# Patient Record
Sex: FEMALE | Race: WHITE | NOT HISPANIC OR LATINO | URBAN - METROPOLITAN AREA
[De-identification: names, ages, dates, MRNs, and addresses within clinical notes are randomized per-mention and may not be internally consistent; named-entity substitution may affect disease eponyms.]

---

## 2019-04-10 ENCOUNTER — EMERGENCY (EMERGENCY)
Facility: HOSPITAL | Age: 39
LOS: 1 days | Discharge: HOME | End: 2019-04-10
Attending: EMERGENCY MEDICINE | Admitting: EMERGENCY MEDICINE
Payer: COMMERCIAL

## 2019-04-10 VITALS
RESPIRATION RATE: 18 BRPM | HEART RATE: 94 BPM | OXYGEN SATURATION: 98 % | TEMPERATURE: 99 F | SYSTOLIC BLOOD PRESSURE: 102 MMHG | DIASTOLIC BLOOD PRESSURE: 66 MMHG

## 2019-04-10 VITALS
WEIGHT: 134.92 LBS | SYSTOLIC BLOOD PRESSURE: 112 MMHG | HEIGHT: 67 IN | OXYGEN SATURATION: 100 % | HEART RATE: 97 BPM | DIASTOLIC BLOOD PRESSURE: 74 MMHG | RESPIRATION RATE: 18 BRPM | TEMPERATURE: 97 F

## 2019-04-10 DIAGNOSIS — R07.0 PAIN IN THROAT: ICD-10-CM

## 2019-04-10 DIAGNOSIS — Z88.8 ALLERGY STATUS TO OTHER DRUGS, MEDICAMENTS AND BIOLOGICAL SUBSTANCES STATUS: ICD-10-CM

## 2019-04-10 DIAGNOSIS — Z79.899 OTHER LONG TERM (CURRENT) DRUG THERAPY: ICD-10-CM

## 2019-04-10 DIAGNOSIS — J03.90 ACUTE TONSILLITIS, UNSPECIFIED: ICD-10-CM

## 2019-04-10 LAB — S PYO AG SPEC QL IA: NEGATIVE — SIGNIFICANT CHANGE UP

## 2019-04-10 PROCEDURE — 87880 STREP A ASSAY W/OPTIC: CPT

## 2019-04-10 PROCEDURE — 85025 COMPLETE CBC W/AUTO DIFF WBC: CPT

## 2019-04-10 PROCEDURE — 80053 COMPREHEN METABOLIC PANEL: CPT

## 2019-04-10 PROCEDURE — 99284 EMERGENCY DEPT VISIT MOD MDM: CPT | Mod: 25

## 2019-04-10 PROCEDURE — 87081 CULTURE SCREEN ONLY: CPT

## 2019-04-10 PROCEDURE — 99284 EMERGENCY DEPT VISIT MOD MDM: CPT

## 2019-04-10 PROCEDURE — 96375 TX/PRO/DX INJ NEW DRUG ADDON: CPT

## 2019-04-10 PROCEDURE — 96374 THER/PROPH/DIAG INJ IV PUSH: CPT

## 2019-04-10 RX ORDER — FLUOXETINE HCL 10 MG
0 CAPSULE ORAL
Qty: 0 | Refills: 0 | COMMUNITY

## 2019-04-10 RX ORDER — KETOROLAC TROMETHAMINE 30 MG/ML
15 SYRINGE (ML) INJECTION ONCE
Qty: 0 | Refills: 0 | Status: DISCONTINUED | OUTPATIENT
Start: 2019-04-10 | End: 2019-04-10

## 2019-04-10 RX ORDER — SODIUM CHLORIDE 9 MG/ML
1000 INJECTION INTRAMUSCULAR; INTRAVENOUS; SUBCUTANEOUS ONCE
Qty: 0 | Refills: 0 | Status: COMPLETED | OUTPATIENT
Start: 2019-04-10 | End: 2019-04-10

## 2019-04-10 RX ORDER — DEXAMETHASONE 0.5 MG/5ML
10 ELIXIR ORAL ONCE
Qty: 0 | Refills: 0 | Status: COMPLETED | OUTPATIENT
Start: 2019-04-10 | End: 2019-04-10

## 2019-04-10 RX ORDER — SPIRONOLACTONE 25 MG/1
0 TABLET, FILM COATED ORAL
Qty: 0 | Refills: 0 | COMMUNITY

## 2019-04-10 RX ORDER — BUPROPION HYDROCHLORIDE 150 MG/1
0 TABLET, EXTENDED RELEASE ORAL
Qty: 0 | Refills: 0 | COMMUNITY

## 2019-04-10 RX ORDER — CLONAZEPAM 1 MG
0 TABLET ORAL
Qty: 0 | Refills: 0 | COMMUNITY

## 2019-04-10 RX ADMIN — Medication 100 MILLIGRAM(S): at 18:43

## 2019-04-10 RX ADMIN — SODIUM CHLORIDE 2000 MILLILITER(S): 9 INJECTION INTRAMUSCULAR; INTRAVENOUS; SUBCUTANEOUS at 18:36

## 2019-04-10 RX ADMIN — Medication 102 MILLIGRAM(S): at 18:35

## 2019-04-10 RX ADMIN — Medication 15 MILLIGRAM(S): at 18:36

## 2019-04-10 NOTE — ED ADULT NURSE NOTE - OBJECTIVE STATEMENT
pt referred from [facility] with peritonsillar abscess ,worse since Monday with body aches and feeling unwell

## 2019-04-10 NOTE — ED ADULT NURSE NOTE - NSIMPLEMENTINTERV_GEN_ALL_ED
Implemented All Universal Safety Interventions:  [place] to call system. Call bell, personal items and telephone within reach. Instruct patient to call for assistance. Room bathroom lighting operational. Non-slip footwear when patient is off stretcher. Physically safe environment: no spills, clutter or unnecessary equipment. Stretcher in lowest position, wheels locked, appropriate side rails in place.

## 2019-04-10 NOTE — ED PROVIDER NOTE - NS ED ROS FT
CONSTITUTIONAL: HPI  NEURO: No headache, no dizziness, no syncope; No focal weakness/tingling/numbness  EYES: No visual changes  ENT: No rhinorrhea or nasal congestion  PULM: No cough or dyspnea  CV: No chest pain or palpitations  GI: No abdominal pain, vomiting, or diarrhea  : No dysuria, hematuria, frequency  MSK: No neck pain or back pain, no joint pain  SKIN: no rash or unusual bruising

## 2019-04-10 NOTE — ED PROVIDER NOTE - OBJECTIVE STATEMENT
39 yo fem without sig PMHx c/o several days sore throat.  Seen at  two days ago neg strep test, thought to be viral.  Had been using Tylenol and Dayquil without relief; today took 2 doses Advil without much relief; last dose 6 hrs ago; return visit to  today and then sent to ED for eval of possible PTA.  Pain worse on the left.  Able to swallow liquids and solids but with some pain.  Subjectively feeling warm, but no documented fever.  No diff breathing.  No neck stiffness.    PMHx anx dep  Meds SSRI  ALL cephalosporin sulfa

## 2019-04-10 NOTE — ED ADULT TRIAGE NOTE - CHIEF COMPLAINT QUOTE
Patient presents status post [facility].  +Left peritonsillar abscess no drooling or difficulty speaking.  Able to speak in full complete sentences.  Denies chest pain or SOB.

## 2019-04-10 NOTE — ED PROVIDER NOTE - NSFOLLOWUPINSTRUCTIONS_ED_ALL_ED_FT
Tonsillitis  ImageTonsillitis is an infection of the throat. This infection causes the tonsils to become red, tender, and swollen. Tonsils are tissues in the back of your throat. If bacteria caused your infection, antibiotic medicine will be given to you. Sometimes, symptoms of this infection can be treated with the use of medicines that lessen swelling (steroids). If your tonsillitis is very bad (severe) and happens often, you may need to get your tonsils removed (tonsillectomy).    Follow these instructions at home:  Medicines     Take over-the-counter and prescription medicines only as told by your doctor.  If you were prescribed an antibiotic, take it as told by your doctor. Do not stop taking the antibiotic even if you start to feel better.  Eating and drinking     Drink enough fluid to keep your pee (urine) clear or pale yellow.  While your throat is sore, eat soft or liquid foods like:    Soup.  Sherbert.  Instant breakfast drinks.    Drink warm fluids.  Eat frozen ice pops.  General instructions     Rest as much as possible and get plenty of sleep.  Gargle with a salt-water mixture 3–4 times a day or as needed. To make a salt-water mixture, completely dissolve ½-1 tsp of salt in 1 cup of warm water.  Wash your hands often with soap and water. If there is no soap and water, use hand .  Do not share cups, bottles, or other utensils until your symptoms are gone.  Do not smoke. If you need help quitting, ask your doctor.  Keep all follow-up visits as told by your doctor. This is important.  Contact a doctor if:  You have large, tender lumps in your neck.  You have a fever that does not go away after 2–3 days.  You have a rash.  You cough up green, yellow-brown, or bloody fluid.  You cannot swallow liquids or food for 24 hours.  Only one of your tonsils is swollen.  Get help right away if:  You have any new symptoms such as:    Vomiting.  Very bad headache.  Stiff neck.  Chest pain.  Trouble breathing or swallowing.    You have very bad throat pain and you also have drooling or voice changes.  You have very bad pain that is not helped by medicine.  You cannot fully open your mouth.  You have redness, swelling, or severe pain anywhere in your neck.  Summary  Tonsillitis causes your tonsils to be red, tender, and swollen.  While your throat is sore, eat soft or liquid foods.  Gargle with a salt-water mixture 3–4 times a day or as needed.  Do not share cups, bottles, or other utensils until your symptoms are gone.  This information is not intended to replace advice given to you by your health care provider. Make sure you discuss any questions you have with your health care provider.    Document Released: 06/05/2009 Document Revised: 01/23/2018 Document Reviewed: 01/23/2018  [employer] Interactive Patient Education © 2019 [employer]

## 2019-04-10 NOTE — ED PROVIDER NOTE - ATTENDING CONTRIBUTION TO CARE
38 F - co throat pain L worse than R for 2 days- seen at [facility] 2 days ago- told she had viral sore throat  mandy po food/fluids +subj fevers  vss  BL exudatitive tonsillitis + early PTA  s1s2 lungs cta bl  abd soft nt nd +bs  ext no c/c/e  IMP- Early PTA  iv abx, consult ENT

## 2019-04-10 NOTE — CONSULT NOTE ADULT - ASSESSMENT
38F w/ sore throat 3 days, previously thought to be viral, now with tonsillar exudates, ENT consulted for r/o PTA      PAST MEDICAL & SURGICAL HISTORY:  Anxiety  Depression    Allergies    Duricef (Hives)  Sular (Hives)  sulbactam (Unknown)    Intolerances      MEDICATIONS  (STANDING):    MEDICATIONS  (PRN):        ROS:   ENT: all negative except as noted in HPI   CV: denies palpitations  Pulm: denies SOB, cough, hemoptysis  GI: denies change in apetite, indigestion, n/v  : denies pertinent urinary symptoms, urgency  Neuro: denies numbness/tingling, loss of sensation  Psych: denies anxiety  MS: denies muscle weakness, instability  Heme: denies easy bruising or bleeding  Endo: denies heat/cold intolerance, excessive sweating  Vascular: denies LE edema    Vital Signs Last 24 Hrs  T(C): 36.1 (10 Apr 2019 16:45), Max: 36.1 (10 Apr 2019 16:45)  T(F): 97 (10 Apr 2019 16:45), Max: 97 (10 Apr 2019 16:45)  HR: 97 (10 Apr 2019 16:45) (97 - 97)  BP: 112/74 (10 Apr 2019 16:45) (112/74 - 112/74)  BP(mean): --  RR: 18 (10 Apr 2019 16:45) (18 - 18)  SpO2: 100% (10 Apr 2019 16:45) (100% - 100%)                          12.4   14.31 )-----------( 129      ( 10 Apr 2019 17:41 )             35.8    04-10    133<L>  |  97  |  7   ----------------------------<  121<H>  3.8   |  27  |  0.64    Ca    9.0      10 Apr 2019 17:41    TPro  7.1  /  Alb  4.0  /  TBili  0.5  /  DBili  x   /  AST  12  /  ALT  12  /  AlkPhos  42  04-10       PHYSICAL EXAM:  Gen: NAD  Skin: No rashes, bruises, or lesions  Head: Normocephalic, Atraumatic  Face: no edema, erythema, or fluctuance. Parotid glands soft without mass  Eyes: no scleral injection  Nose: Nares bilaterally patent, no discharge  Mouth: No Stridor / Drooling / Trismus.  Mucosa moist, tongue/uvula midline, oropharynx clear. Erythema over peritonsillar areas L>R, no flucutance or fullness. B/l tonsillar exudate L>R.  Neck: Flat, supple, no lymphadenopathy, trachea midline, no masses  Lymphatic: No lymphadenopathy  Resp: breathing easily, no stridor  CV: no peripheral edema/cyanosis  GI: nondistended   Peripheral vascular: no JVD or edema  Neuro: facial nerve intact, no facial droop      A/P: 38F w/ acute tonsillitis, likely bacterial, no PTA  -No indication for I&D  -Abx management as outpatient (augmentin vs. clindamycin)  -Saline rinses QID  -Return precautions given  -Please page ENT with questions/concerns

## 2019-04-10 NOTE — ED PROVIDER NOTE - PHYSICAL EXAMINATION
CONSTITUTIONAL: WD,WN. NAD.    SKIN: Normal color and turgor. No rash.    HEAD: NC/AT.  EYES: Conjunctiva clear. EOMI. PERRL.    ENT: Airway patent, OP bilat tonsillar erythema and exudates, mild tonsillar swelling; left equivocally larger than right; uvula midline without swelling. Nasal mucosa clear, no rhinorrhea.   RESPIRATORY:  Breathing non-labored. No retractions or accessory muscle use.  Lungs CTA bilat.  CARDIOVASCULAR:  RRR, S1S2. No M/R/G.      GI:  Abdomen soft, nontender.    MSK: Neck supple with painless ROM.  No lower extremity edema or calf tenderness.  No joint swelling or ROM limitation.  NEURO: Alert and oriented; CN II-XII grossly intact. Speech clear. 5/5 strength in all extremities.  Normal balance and gait.  LYMPH: + ant cervical CARIN

## 2024-06-06 PROBLEM — Z00.00 ENCOUNTER FOR PREVENTIVE HEALTH EXAMINATION: Status: ACTIVE | Noted: 2024-06-06

## 2024-06-06 PROBLEM — F41.9 ANXIETY DISORDER, UNSPECIFIED: Chronic | Status: ACTIVE | Noted: 2019-04-10

## 2024-06-06 PROBLEM — F32.9 MAJOR DEPRESSIVE DISORDER, SINGLE EPISODE, UNSPECIFIED: Chronic | Status: ACTIVE | Noted: 2019-04-10

## 2024-06-27 ENCOUNTER — APPOINTMENT (OUTPATIENT)
Dept: ORTHOPEDIC SURGERY | Facility: CLINIC | Age: 44
End: 2024-06-27

## 2024-06-27 VITALS — BODY MASS INDEX: 21.33 KG/M2 | WEIGHT: 128 LBS | HEIGHT: 65 IN | RESPIRATION RATE: 16 BRPM

## 2024-06-27 DIAGNOSIS — M65.4 RADIAL STYLOID TENOSYNOVITIS [DE QUERVAIN]: ICD-10-CM

## 2024-06-27 DIAGNOSIS — Z86.39 PERSONAL HISTORY OF OTHER ENDOCRINE, NUTRITIONAL AND METABOLIC DISEASE: ICD-10-CM

## 2024-06-27 DIAGNOSIS — Z80.9 FAMILY HISTORY OF MALIGNANT NEOPLASM, UNSPECIFIED: ICD-10-CM

## 2024-06-27 DIAGNOSIS — M25.532 PAIN IN LEFT WRIST: ICD-10-CM

## 2024-06-27 DIAGNOSIS — Z72.0 TOBACCO USE: ICD-10-CM

## 2024-06-27 DIAGNOSIS — R20.0 ANESTHESIA OF SKIN: ICD-10-CM

## 2024-06-27 DIAGNOSIS — Z87.891 PERSONAL HISTORY OF NICOTINE DEPENDENCE: ICD-10-CM

## 2024-06-27 DIAGNOSIS — Z82.61 FAMILY HISTORY OF ARTHRITIS: ICD-10-CM

## 2024-06-27 PROCEDURE — 20550 NJX 1 TENDON SHEATH/LIGAMENT: CPT

## 2024-06-27 PROCEDURE — 73110 X-RAY EXAM OF WRIST: CPT | Mod: 50

## 2024-06-27 PROCEDURE — 20526 THER INJECTION CARP TUNNEL: CPT | Mod: RT

## 2024-06-27 PROCEDURE — 99204 OFFICE O/P NEW MOD 45 MIN: CPT | Mod: 25

## 2024-07-19 NOTE — ED ADULT TRIAGE NOTE - LOCATION:
Per chart review, pt had annual with [facility] on 04/24/23, last menopausal visit on 07/15/24 and estrace cream ordered at that visit.    Rx estradiol (ESTRACE) 0.1 MG/GM vaginal cream 42.5g with 2 refill ordered to [facility] in [place] on 07/15/24.     Called pharmacy and confirmed that prescription is ready for .   [employer] message sent to pt.   
Per chart review, pt was seen 7/15/24 to discuss menopause.   
Left arm;